# Patient Record
Sex: FEMALE | Race: AMERICAN INDIAN OR ALASKA NATIVE | NOT HISPANIC OR LATINO | Employment: PART TIME | URBAN - METROPOLITAN AREA
[De-identification: names, ages, dates, MRNs, and addresses within clinical notes are randomized per-mention and may not be internally consistent; named-entity substitution may affect disease eponyms.]

---

## 2022-08-19 ENCOUNTER — NURSE TRIAGE (OUTPATIENT)
Dept: NURSING | Facility: CLINIC | Age: 42
End: 2022-08-19

## 2022-08-19 NOTE — TELEPHONE ENCOUNTER
Nurse Triage SBAR    Is this a 2nd Level Triage? No    Situation: Patient is calling with concern of difficulty breathing, bad cough, chest hurts. Cough has been worsening over the past month. Cough, can't breathe during coughing spells. Has not taken a Covid test, does not have one available. Has a strong, continuous cough during the call.     Boyfriend, Darrick, is on the call, patient gave consent to speak with Darrick.     Background: Diagnosed with latent TB about one month TB.    Assessment:   Dark brown to green phlegm  Chest hurts   Does not feel warm right now.     Recommendation: Per disposition, see HCP within 4 hours (or PCP Triage). Patient is not currently established with [facility]. Recommended UC within 4 hours. Advised patient to call back with any new or worsening symptoms. Patient verbalized understanding and agrees with plan.    Protocol Recommended Disposition: Urgent care center    Karolina Cruz RN on 8/19/2022 at 11:31 AM    Reason for Disposition    [1] MILD difficulty breathing (e.g., minimal/no SOB at rest, SOB with walking, pulse <100) AND [2] still present when not coughing    Additional Information    Negative: SEVERE difficulty breathing (e.g., struggling for each breath, speaks in single words)    Negative: Bluish (or gray) lips or face now    Negative: [1] Difficulty breathing AND [2] exposure to flames, smoke, or fumes    Negative: Sounds like a life-threatening emergency to the triager    Negative: [1] Stridor AND [2] difficulty breathing    Negative: [1] Previous asthma attacks AND [2] this feels like asthma attack    Negative: Dry cough (non-productive;  no sputum or minimal clear sputum)    Negative: [1] MODERATE difficulty breathing (e.g., speaks in phrases, SOB even at rest, pulse 100-120) AND [2] still present when not coughing    Negative: Chest pain  (Exception: MILD central chest pain, present only when coughing)    Negative: Patient sounds very sick or weak to the  triager    Protocols used: COUGH - ACUTE [id number]

## 2022-08-20 ENCOUNTER — OFFICE VISIT (OUTPATIENT)
Dept: URGENT CARE | Facility: URGENT CARE | Age: 42
End: 2022-08-20
Payer: COMMERCIAL

## 2022-08-20 VITALS
TEMPERATURE: 98.9 F | WEIGHT: 179 LBS | BODY MASS INDEX: 30.73 KG/M2 | HEART RATE: 133 BPM | DIASTOLIC BLOOD PRESSURE: 124 MMHG | OXYGEN SATURATION: 97 % | SYSTOLIC BLOOD PRESSURE: 175 MMHG

## 2022-08-20 DIAGNOSIS — F15.10 METHAMPHETAMINE USE (H): ICD-10-CM

## 2022-08-20 DIAGNOSIS — R07.9 CHEST PAIN, UNSPECIFIED TYPE: Primary | ICD-10-CM

## 2022-08-20 DIAGNOSIS — R00.0 TACHYCARDIA: ICD-10-CM

## 2022-08-20 PROCEDURE — 99205 OFFICE O/P NEW HI 60 MIN: CPT | Performed by: PHYSICIAN ASSISTANT

## 2022-08-20 PROCEDURE — 93000 ELECTROCARDIOGRAM COMPLETE: CPT | Performed by: PHYSICIAN ASSISTANT

## 2022-08-20 NOTE — PROGRESS NOTES
SUBJECTIVE:  Nikkie Balbuena is a 42 year old female who presents to the office with the CC of chest pain, cough, shortness of breath.  States injected methamphetamines 2 hours ago.  Patient is sweating, hyperactive.      Past Medical History:   Diagnosis Date     Bipolar disorder (H)      Depressive disorder      Hydradenitis      Hypertension          Current Outpatient Medications:      oxyCODONE (ROXICODONE) 5 MG immediate release tablet, Take 1-2 tablets (5-10 mg) by mouth every 6 hours as needed for pain, Disp: 20 tablet, Rfl: 0    Social History     Tobacco Use     Smoking status: Current Every Day Smoker     Packs/day: 0.50     Years: 10.00     Pack years: 5.00     Smokeless tobacco: Never Used   Substance Use Topics     Alcohol use: No     Alcohol/week: 0.0 standard drinks       ROS:  Review of systems negative except as stated above.    EXAM:  BP (!) 175/124   Pulse (!) 133   Temp 98.9  F (37.2  C) (Tympanic)   Wt 81.2 kg (179 lb)   SpO2 97%   BMI 30.73 kg/m    GENERAL APPEARANCE: marked distress, unable to find position of comfort, not able to calm  SKIN: diaphoretic    EKG: tachycardic      Assessment  / IMPRESSION  (R07.9) Chest pain, unspecified type  (primary encounter diagnosis)  (F15.10) Methamphetamine use (H)  (R00.0) Tachycardia  Plan: EKG 12-lead complete w/read - Clinics    To ED by ambulance

## 2022-09-27 ENCOUNTER — HOSPITAL ENCOUNTER (EMERGENCY)
Facility: CLINIC | Age: 42
Discharge: HOME | End: 2022-09-27
Attending: EMERGENCY MEDICINE | Admitting: EMERGENCY MEDICINE
Payer: COMMERCIAL

## 2022-09-27 VITALS
HEART RATE: 93 BPM | OXYGEN SATURATION: 99 % | RESPIRATION RATE: 16 BRPM | DIASTOLIC BLOOD PRESSURE: 95 MMHG | TEMPERATURE: 98.2 F | SYSTOLIC BLOOD PRESSURE: 153 MMHG

## 2022-09-27 DIAGNOSIS — F11.90 OPIOID USE DISORDER: ICD-10-CM

## 2022-09-27 DIAGNOSIS — E11.9 TYPE 2 DIABETES MELLITUS WITHOUT COMPLICATION, WITHOUT LONG-TERM CURRENT USE OF INSULIN (H): ICD-10-CM

## 2022-09-27 DIAGNOSIS — G51.0 BELL'S PALSY: ICD-10-CM

## 2022-09-27 LAB — GLUCOSE BLDC GLUCOMTR-MCNC: 159 MG/DL (ref 70–99)

## 2022-09-27 PROCEDURE — 250N000013 HC RX MED GY IP 250 OP 250 PS 637: Performed by: EMERGENCY MEDICINE

## 2022-09-27 PROCEDURE — 93005 ELECTROCARDIOGRAM TRACING: CPT | Mod: RTG

## 2022-09-27 PROCEDURE — 99284 EMERGENCY DEPT VISIT MOD MDM: CPT | Performed by: EMERGENCY MEDICINE

## 2022-09-27 PROCEDURE — 250N000012 HC RX MED GY IP 250 OP 636 PS 637: Performed by: EMERGENCY MEDICINE

## 2022-09-27 RX ORDER — VALACYCLOVIR HYDROCHLORIDE 1 G/1
1000 TABLET, FILM COATED ORAL ONCE
Status: COMPLETED | OUTPATIENT
Start: 2022-09-27 | End: 2022-09-27

## 2022-09-27 RX ORDER — PREDNISONE 20 MG/1
60 TABLET ORAL ONCE
Status: COMPLETED | OUTPATIENT
Start: 2022-09-27 | End: 2022-09-27

## 2022-09-27 RX ORDER — VALACYCLOVIR HYDROCHLORIDE 1 G/1
1000 TABLET, FILM COATED ORAL 3 TIMES DAILY
Qty: 21 TABLET | Refills: 0 | Status: SHIPPED | OUTPATIENT
Start: 2022-09-27 | End: 2023-03-09

## 2022-09-27 RX ORDER — PREDNISONE 20 MG/1
60 TABLET ORAL DAILY
Qty: 18 TABLET | Refills: 0 | Status: SHIPPED | OUTPATIENT
Start: 2022-09-28 | End: 2022-10-04

## 2022-09-27 RX ADMIN — PREDNISONE 60 MG: 20 TABLET ORAL at 22:33

## 2022-09-27 RX ADMIN — VALACYCLOVIR HYDROCHLORIDE 1000 MG: 1 TABLET, FILM COATED ORAL at 22:33

## 2022-09-27 ASSESSMENT — ENCOUNTER SYMPTOMS
NAUSEA: 0
ABDOMINAL PAIN: 0
SHORTNESS OF BREATH: 0
COUGH: 0
WEAKNESS: 1
DIFFICULTY URINATING: 0
SEIZURES: 0
SPEECH DIFFICULTY: 1
FEVER: 0
BACK PAIN: 0
EYE REDNESS: 0
DIARRHEA: 0
CONFUSION: 0
HEADACHES: 0
VOMITING: 0

## 2022-09-28 LAB
ATRIAL RATE - MUSE: 99 BPM
DIASTOLIC BLOOD PRESSURE - MUSE: NORMAL MMHG
INTERPRETATION ECG - MUSE: NORMAL
P AXIS - MUSE: NORMAL DEGREES
PR INTERVAL - MUSE: 142 MS
QRS DURATION - MUSE: 86 MS
QT - MUSE: 340 MS
QTC - MUSE: 436 MS
R AXIS - MUSE: 19 DEGREES
SYSTOLIC BLOOD PRESSURE - MUSE: NORMAL MMHG
T AXIS - MUSE: -20 DEGREES
VENTRICULAR RATE- MUSE: 99 BPM

## 2022-09-28 NOTE — DISCHARGE INSTRUCTIONS
Please be sure to use eyedrops to moisturize your left eye as the eyelid does not close completely.

## 2022-09-28 NOTE — ED PROVIDER NOTES
ED Provider Note  [facility]      History     Chief Complaint   Patient presents with     Facial Droop     Left facial drooping, noticed yesterday but getting worse today     Extremity Weakness     Bilateral arm weakness     HPI  Nikkie Balbuena is a 42 year old female who presents to the emergency department with acute onset left-sided facial weakness, blurry vision, slurred speech since yesterday, was getting worse today.  Patient reports that approximately 2 weeks ago she did develop a cough but seem to have recovered.  She denies any fevers or chills.  Reports she has a history of diabetes, takes metformin and states she is compliant.  However, she does not check her blood sugar regularly and she has checked it in the past and had it be elevated above 300.  She is unsure what level her glucose is at right now.  In triage she did report some bilateral arm weakness but does not endorse this currently to me.  Of note, patient also reports that she has not history of hidradenitis, hepatitis C, also has uses IV drugs regularly.  She denies having any skin lesions that she is concerned about infection currently.  She reports that she has been thinking about quitting and is interested in pursuing opioid detox.    Past Medical History  Past Medical History:   Diagnosis Date     Bipolar disorder (H)      Depressive disorder      Hydradenitis      Hypertension    Diabetes  Hepatitis C  IV drug use        Past Surgical History:   Procedure Laterality Date      SECTION      x2     CHOLECYSTECTOMY       DENTAL SURGERY  15     ORTHOPEDIC SURGERY       SOFT TISSUE SURGERY      armpits     [START ON 2022] predniSONE (DELTASONE) 20 MG tablet  valACYclovir (VALTREX) 1000 mg tablet  oxyCODONE (ROXICODONE) 5 MG immediate release tablet      Allergies   Allergen Reactions     Codeine Sulfate Anaphylaxis and GI Disturbance     Family History  No family history on file.  Social History    Social History     Tobacco Use     Smoking status: Current Every Day Smoker     Packs/day: 0.50     Years: 10.00     Pack years: 5.00     Smokeless tobacco: Never Used   Substance Use Topics     Alcohol use: No     Alcohol/week: 0.0 standard drinks     Drug use: IV Drug use (opioids, methamphetamine)      Past medical history, past surgical history, medications, allergies, family history, and social history were reviewed with the patient. No additional pertinent items.       Review of Systems   Constitutional: Negative for fever.   HENT: Negative for congestion.    Eyes: Positive for visual disturbance. Negative for redness.   Respiratory: Negative for cough and shortness of breath.    Cardiovascular: Negative for chest pain.   Gastrointestinal: Negative for abdominal pain, diarrhea, nausea and vomiting.   Genitourinary: Negative for difficulty urinating.   Musculoskeletal: Negative for back pain.   Skin: Negative for rash.   Neurological: Positive for speech difficulty and weakness. Negative for seizures and headaches.   Psychiatric/Behavioral: Negative for confusion.     A complete review of systems was performed with pertinent positives and negatives noted in the HPI, and all other systems negative.    Physical Exam   BP: (!) 166/123  Pulse: 92  Temp: 98.2  F (36.8  C)  Resp: 16  SpO2: 100 %  Physical Exam  Constitutional:       General: She is awake. She is not in acute distress.     Appearance: Normal appearance. She is well-developed. She is not ill-appearing or toxic-appearing.   HENT:      Head: Atraumatic.      Mouth/Throat:      Pharynx: No oropharyngeal exudate.   Eyes:      General: No scleral icterus.     Extraocular Movements: Extraocular movements intact.      Conjunctiva/sclera: Conjunctivae normal.      Pupils: Pupils are equal, round, and reactive to light.      Comments: Weak left eyelid   Cardiovascular:      Rate and Rhythm: Normal rate and regular rhythm.   Pulmonary:      Effort: Pulmonary  effort is normal. No respiratory distress.   Abdominal:      General: Abdomen is flat.   Musculoskeletal:         General: No tenderness.   Skin:     General: Skin is warm.      Findings: No rash.   Neurological:      Mental Status: She is alert and oriented to person, place, and time.      Cranial Nerves: Facial asymmetry present.      Deep Tendon Reflexes:      Reflex Scores:       Patellar reflexes are 2+ on the right side and 2+ on the left side.     Comments: Isolated peripheral facial nerve palsy with clear involvement of the forehead, mild decrease in sensation throughout the left side of her face.  The remainder of her neurologic exam is unrevealing and reassuring particularly in the remaining cranial nerves, bilateral upper extremities and lower extremities.   Psychiatric:         Attention and Perception: Attention normal.         Mood and Affect: Mood normal.         Speech: Speech normal.         Behavior: Behavior normal. Behavior is cooperative.         ED Course      Procedures                     Results for orders placed or performed during the hospital encounter of 09/27/22   Glucose by meter     Status: Abnormal   Result Value Ref Range    GLUCOSE BY METER POCT 159 (H) 70 - 99 mg/dL   EKG 12 lead     Status: None (Preliminary result)   Result Value Ref Range    Systolic Blood Pressure  mmHg    Diastolic Blood Pressure  mmHg    Ventricular Rate 99 BPM    Atrial Rate 99 BPM    DC Interval 142 ms    QRS Duration 86 ms     ms    QTc 436 ms    P Axis  degrees    R AXIS 19 degrees    T Axis -20 degrees    Interpretation ECG       Sinus rhythm  Low voltage QRS  Cannot rule out Anterior infarct , age undetermined  Abnormal ECG       Medications   predniSONE (DELTASONE) tablet 60 mg (has no administration in time range)   valACYclovir (VALTREX) tablet 1,000 mg (has no administration in time range)        Assessments & Plan (with Medical Decision Making)   Nikkie Balbuena is a 42 year old female who  presents to the emergency department with acute onset left-sided facial weakness, blurry vision, slurred speech since yesterday, was getting worse today.  History and exam are most consistent with an isolated Bell's palsy.  She has very clear forehead involvement and the remainder of her neurologic exam is reassuring (the decrease in facial sensation is of unclear significance and the least reliable portion of the exam).  Her blood sugar today is 159.  Although this is higher than her goal, I do not feel that her hyperglycemia explains any of her neurologic deficits nor is it sufficiently high where I would consider adjusting her medications or admission.  As far as her Bell's palsy, will initiate treatment here with prednisone as well as valacyclovir, referred to neurology for outpatient follow-up.  Patient reports that she has a primary care physician but does not like that physician, we will go ahead and place a referral for primary care as she would benefit from close follow-up regarding her diabetes as well as hepatitis C.  We will also provide with information regarding the recovery clinic to assist with medication assisted treatment for her opioid use disorder.    I have reviewed the nursing notes. I have reviewed the findings, diagnosis, plan and need for follow up with the patient.    At this point no indication for further emergent labs or imaging.  We will proceed with discharge with a prescription for prednisone as well as valacyclovir and the outpatient referrals as above.  Return precautions given.  Patient voiced understanding and agreement with the plan.  Okay to discharge.    New Prescriptions    PREDNISONE (DELTASONE) 20 MG TABLET    Take 3 tablets (60 mg) by mouth daily for 6 days    VALACYCLOVIR (VALTREX) 1000 MG TABLET    Take 1 tablet (1,000 mg) by mouth 3 times daily       Final diagnoses:   Bell's palsy   Opioid use disorder       --  Jabier Pearce MD PhD  [facility] EMERGENCY  DEPARTMENT  9/27/2022     Jabier Pearce MD  09/27/22 [id number]

## 2022-10-04 ENCOUNTER — APPOINTMENT (OUTPATIENT)
Dept: GENERAL RADIOLOGY | Facility: CLINIC | Age: 42
End: 2022-10-04
Attending: EMERGENCY MEDICINE
Payer: COMMERCIAL

## 2022-10-04 ENCOUNTER — HOSPITAL ENCOUNTER (EMERGENCY)
Facility: CLINIC | Age: 42
Discharge: HOME | End: 2022-10-05
Attending: EMERGENCY MEDICINE | Admitting: EMERGENCY MEDICINE
Payer: COMMERCIAL

## 2022-10-04 DIAGNOSIS — F19.91 HISTORY OF DRUG USE: ICD-10-CM

## 2022-10-04 DIAGNOSIS — R41.82 ALTERED MENTAL STATUS, UNSPECIFIED ALTERED MENTAL STATUS TYPE: ICD-10-CM

## 2022-10-04 LAB
ANION GAP SERPL CALCULATED.3IONS-SCNC: 10 MMOL/L (ref 7–15)
APAP SERPL-MCNC: <5 UG/ML (ref 10–30)
BASOPHILS # BLD AUTO: 0 10E3/UL (ref 0–0.2)
BASOPHILS NFR BLD AUTO: 1 %
BUN SERPL-MCNC: 10 MG/DL (ref 7–30)
BUN SERPL-MCNC: 10.2 MG/DL (ref 6–20)
CA-I BLD-MCNC: 5 MG/DL (ref 4.4–5.2)
CALCIUM SERPL-MCNC: 9.1 MG/DL (ref 8.6–10)
CHLORIDE BLD-SCNC: 107 MMOL/L (ref 94–109)
CHLORIDE SERPL-SCNC: 107 MMOL/L (ref 98–107)
CO2 BLD-SCNC: 26 MMOL/L (ref 20–32)
CREAT BLD-MCNC: 0.6 MG/DL (ref 0.5–1)
CREAT SERPL-MCNC: 0.66 MG/DL (ref 0.51–0.95)
DEPRECATED HCO3 PLAS-SCNC: 25 MMOL/L (ref 22–29)
EOSINOPHIL # BLD AUTO: 0.1 10E3/UL (ref 0–0.7)
EOSINOPHIL NFR BLD AUTO: 2 %
ERYTHROCYTE [DISTWIDTH] IN BLOOD BY AUTOMATED COUNT: 13.3 % (ref 10–15)
ETHANOL SERPL-MCNC: <0.01 G/DL
FLUAV RNA SPEC QL NAA+PROBE: NEGATIVE
FLUBV RNA RESP QL NAA+PROBE: NEGATIVE
GFR SERPL CREATININE-BSD FRML MDRD: >90 ML/MIN/1.73M2
GLUCOSE BLD-MCNC: 101 MG/DL (ref 70–99)
GLUCOSE SERPL-MCNC: 102 MG/DL (ref 70–99)
HCG SER QL IA.RAPID: NEGATIVE
HCT VFR BLD AUTO: 36.6 % (ref 35–47)
HCT VFR BLD CALC: 40 % (ref 35–47)
HGB BLD-MCNC: 11.8 G/DL (ref 11.7–15.7)
HGB BLD-MCNC: 13.6 G/DL (ref 11.7–15.7)
HOLD SPECIMEN: NORMAL
IMM GRANULOCYTES # BLD: 0 10E3/UL
IMM GRANULOCYTES NFR BLD: 0 %
LACTATE SERPL-SCNC: 0.5 MMOL/L (ref 0.7–2)
LYMPHOCYTES # BLD AUTO: 2.1 10E3/UL (ref 0.8–5.3)
LYMPHOCYTES NFR BLD AUTO: 35 %
MCH RBC QN AUTO: 27.8 PG (ref 26.5–33)
MCHC RBC AUTO-ENTMCNC: 32.2 G/DL (ref 31.5–36.5)
MCV RBC AUTO: 86 FL (ref 78–100)
MONOCYTES # BLD AUTO: 0.4 10E3/UL (ref 0–1.3)
MONOCYTES NFR BLD AUTO: 7 %
NEUTROPHILS # BLD AUTO: 3.4 10E3/UL (ref 1.6–8.3)
NEUTROPHILS NFR BLD AUTO: 55 %
NRBC # BLD AUTO: 0 10E3/UL
NRBC BLD AUTO-RTO: 0 /100
PLATELET # BLD AUTO: 411 10E3/UL (ref 150–450)
POTASSIUM BLD-SCNC: 4.2 MMOL/L (ref 3.4–5.3)
POTASSIUM SERPL-SCNC: 3.8 MMOL/L (ref 3.4–5.3)
RBC # BLD AUTO: 4.24 10E6/UL (ref 3.8–5.2)
RSV RNA SPEC NAA+PROBE: NEGATIVE
SALICYLATES SERPL-MCNC: <0.5 MG/DL
SARS-COV-2 RNA RESP QL NAA+PROBE: NEGATIVE
SODIUM BLD-SCNC: 145 MMOL/L (ref 133–144)
SODIUM SERPL-SCNC: 142 MMOL/L (ref 136–145)
WBC # BLD AUTO: 6.1 10E3/UL (ref 4–11)

## 2022-10-04 PROCEDURE — 93005 ELECTROCARDIOGRAM TRACING: CPT

## 2022-10-04 PROCEDURE — 36415 COLL VENOUS BLD VENIPUNCTURE: CPT | Performed by: EMERGENCY MEDICINE

## 2022-10-04 PROCEDURE — 84702 CHORIONIC GONADOTROPIN TEST: CPT

## 2022-10-04 PROCEDURE — 80143 DRUG ASSAY ACETAMINOPHEN: CPT | Performed by: EMERGENCY MEDICINE

## 2022-10-04 PROCEDURE — 82077 ASSAY SPEC XCP UR&BREATH IA: CPT | Performed by: EMERGENCY MEDICINE

## 2022-10-04 PROCEDURE — 82310 ASSAY OF CALCIUM: CPT | Performed by: EMERGENCY MEDICINE

## 2022-10-04 PROCEDURE — 71046 X-RAY EXAM CHEST 2 VIEWS: CPT

## 2022-10-04 PROCEDURE — 84484 ASSAY OF TROPONIN QUANT: CPT | Performed by: EMERGENCY MEDICINE

## 2022-10-04 PROCEDURE — C9803 HOPD COVID-19 SPEC COLLECT: HCPCS

## 2022-10-04 PROCEDURE — 250N000011 HC RX IP 250 OP 636

## 2022-10-04 PROCEDURE — 87637 SARSCOV2&INF A&B&RSV AMP PRB: CPT | Performed by: EMERGENCY MEDICINE

## 2022-10-04 PROCEDURE — 80179 DRUG ASSAY SALICYLATE: CPT | Performed by: EMERGENCY MEDICINE

## 2022-10-04 PROCEDURE — 85014 HEMATOCRIT: CPT | Performed by: EMERGENCY MEDICINE

## 2022-10-04 PROCEDURE — 96376 TX/PRO/DX INJ SAME DRUG ADON: CPT

## 2022-10-04 PROCEDURE — 83605 ASSAY OF LACTIC ACID: CPT | Performed by: EMERGENCY MEDICINE

## 2022-10-04 PROCEDURE — 250N000011 HC RX IP 250 OP 636: Performed by: EMERGENCY MEDICINE

## 2022-10-04 PROCEDURE — 99285 EMERGENCY DEPT VISIT HI MDM: CPT | Mod: 25

## 2022-10-04 PROCEDURE — 96374 THER/PROPH/DIAG INJ IV PUSH: CPT

## 2022-10-04 PROCEDURE — 80047 BASIC METABLC PNL IONIZED CA: CPT

## 2022-10-04 RX ORDER — LORAZEPAM 2 MG/ML
0.5 INJECTION INTRAMUSCULAR ONCE
Status: COMPLETED | OUTPATIENT
Start: 2022-10-04 | End: 2022-10-04

## 2022-10-04 RX ORDER — DIPHENHYDRAMINE HYDROCHLORIDE 50 MG/ML
25 INJECTION INTRAMUSCULAR; INTRAVENOUS ONCE
Status: DISCONTINUED | OUTPATIENT
Start: 2022-10-04 | End: 2022-10-04

## 2022-10-04 RX ORDER — LORAZEPAM 2 MG/ML
1 INJECTION INTRAMUSCULAR ONCE
Status: COMPLETED | OUTPATIENT
Start: 2022-10-04 | End: 2022-10-04

## 2022-10-04 RX ORDER — LORAZEPAM 2 MG/ML
INJECTION INTRAMUSCULAR
Status: COMPLETED
Start: 2022-10-04 | End: 2022-10-04

## 2022-10-04 RX ORDER — OLANZAPINE 5 MG/1
5 TABLET, ORALLY DISINTEGRATING ORAL AT BEDTIME
Status: DISCONTINUED | OUTPATIENT
Start: 2022-10-04 | End: 2022-10-04

## 2022-10-04 RX ADMIN — LORAZEPAM 1 MG: 2 INJECTION INTRAMUSCULAR; INTRAVENOUS at 19:39

## 2022-10-04 RX ADMIN — LORAZEPAM 1 MG: 2 INJECTION INTRAMUSCULAR at 19:39

## 2022-10-04 RX ADMIN — LORAZEPAM 0.5 MG: 2 INJECTION INTRAMUSCULAR; INTRAVENOUS at 15:38

## 2022-10-04 RX ADMIN — LORAZEPAM 0.5 MG: 2 INJECTION INTRAMUSCULAR; INTRAVENOUS at 20:11

## 2022-10-04 ASSESSMENT — ACTIVITIES OF DAILY LIVING (ADL)
ADLS_ACUITY_SCORE: 35

## 2022-10-04 ASSESSMENT — ENCOUNTER SYMPTOMS
TREMORS: 1
COUGH: 1
HEADACHES: 1

## 2022-10-04 NOTE — ED NOTES
Care Management Follow Up    Length of Stay (days): 0    Additional Information:  Patient presents for a drug assessment. Patient is under the influence and not appropriate to meet with at this time. SW placed substance use resources in the AVS.     MARGOT Matias, [facility]  Emergency Room   [phone]-Please contact the SW on the floor in which the patient is staying for any questions or concerns

## 2022-10-04 NOTE — ED NOTES
Bed: ED08  Expected date:   Expected time:   Means of arrival:   Comments:  12 to ED 8 plz for TAD

## 2022-10-04 NOTE — ED TRIAGE NOTES
Arrives via EMS after found in the bathroom being lethargic according to bystanders. Patient reports using fentanyl and meth today at 8 AM. Patient restless, but cooperative. ABCs intact, GCS 15, CMS intact.

## 2022-10-04 NOTE — DISCHARGE INSTRUCTIONS
Substance Use Resources    It is recommended that you abstain from all mood altering chemicals. Please contact the sober support hotline ([phone]) as needed; phones are answered 24 hours a day, 7 days a week.     To access substance abuse treatment you must have an assessment completed within 30 days of starting any program. Information for this to be completed and to secure funding if you have medical assistance or no insurance can be found through your County's chemical health intake line. If you have private insurance, call the customer service number on the back of your insurance card to find an in-network substance abuse assessment. The ideal provider will be a treatment facility, licensed in the state [facility].    For those with [place] Medicaid you will need a Rule 25 assessment: The following are phone numbers for each county. Rule 25 assessments must be completed by your current county of residence. Once approved for funding you can connect with a facility that does Rule 25 assessment.  [facility] [phone]  [facility] [phone]  [facility] [phone]  [facility] [phone]  [facility] [phone]  [facility] [phone]  [facility] [phone]  [place] - [phone]    The following facilities also offer Rule 25/chemical health assessments:    [facility]  [phone]  Mon-Friday: [address] [facility], [address]  Saturday:  [address], [place], [address]  M-F assessments (7a-1:45p); Saturday assessments (7:45-10:45a)    [facility]  [phone]  [address] [place], [place], [address]  *by appointment only M-W; walk ins available Fridays from 10-2.    [facility]  [phone] (phone consultation available 24/7)  In-person Assessments: [address], [address], [place] [address]  [address] [address] [place] [address]  [address] [place] [address]  [address] [place] [address]    [facility]   [phone]  [address] [facility],  [place], [place], [address]  *walk in and appointments available by calling    [facility]  [phone]  [address] [place], [place], [address]  *by appointment only M-Th     [facility]  [phone]  [address] [place], [place], [address]  *walk ins available M-F    [facility]  [phone]  [address] [place], [place], [address]  *available by appointments only    [facility]  [phone]  [address] [place], [place], [address]  *available by appointment only    [facility] ([facility])  [phone]  [address], [address]  *Walk in assessments also available M-F starting at 8 am.    [facility]  [phone]  [address] [place], [place], [address]  *available by appointments    [facility]  [phone]  [address] [place], [place], [address]  *walk in assessments available M-F starting at 7 am.    [facility]  [phone]  [facility]  [address], [place], [place], [address]  *Walk in assessments availble M-F starting at 8 am OR [phone]    [facility]  [address] , [place] [address]  *Walk in assessments available M-F starting at 8 am    [facility]  [phone]  [address] [place] [address]    [facility]  [phone]  [address] [place], [place], [address]  *available by appointment only    [facility]  [phone]  [address] [facility] [place], [place], [address]    [facility]  [phone]  [address], [place], [address]  *available by appointment    [facility]  [phone]  [address], [place], [place], [address]  *walk ins available M-TH from 9-3    [facility]  [phone]  [address] [place], [place], [address]    [place]   [phone]  [address] [place] [address]  [address] [address], [place] [address]    [facility] (Does Rule 25 Assessments)  [email or web address]/  [phone]  [address], [address], [place] [address]    [facility]  [email or web address]-services/drug-alcohol-treatment  [phone], [address], [place] [address]  [phone], [address], [place], [place] [address] [phone], [address] [facility], [address], [place], [place] [address] [phone], [address], ([facility]), [place], [place] [address] [phone], [address] [address] [place] [address]    If you are intoxicated, you may be required to detox at a detox facility before starting treatment. The following are detox facilities that you can self present to. All detox facilities are able to help you complete an assessment/rule 25 prior to discharge if you choose:    [facility]: [address] [place], [place], [address]  [phone]  [facility]: [address] [place], [place], [id number] [phone]  [facility]: [address] [place], [place], [address] [phone]  [facility]: [address] [place], [place], [address] [phone]  [facility]: [address] [facility], [place], [address] [phone]     Ways to help cope with sobriety:  Take prescribed medicines as scheduled  Keep follow-up appointments  Talk to others about your concerns  Get regular exercise  Practice deep breathing skills  Eat a healthy diet  Use community resources, including hotline numbers, Critical access hospital crisis and support meetings  Stay sober and avoid places/people/things associated with substance use  Maintain a daily schedule/routine  Get at least 7-8 hours of sleep per night  Create a list 10--20 healthy activities that you can do that are enjoyable and do  not involve substance use  Create daily goals (approx. 1-4 goals) per day and work to achieve them throughout the day    [facility] ([facility]): [facility] connects people seeking recovery to resources that help foster and sustain long-term recovery. Whether you are seeking resources for treatment, transportation, housing, job training, education, health care or other pathways to recovery, [facility] is a great place to start. Phone: [phone]. www.[facility] (Great listing of all types of recovery and non-recovery related resources)    [facility]: https://www.[employer].org/    Alcoholics Anonymous: 1-800-ALCOHOL  HTTP://WWW.AA.ORG/  [facility] [place] (300-192-0857 or http://aaHD Fantasy Football.org)  [facility] [place] (048-611-2364 or www.aastpaul.org)    Narcotics Anonymous: 431.506.5792  www.Ceann[employer].us.    [facility]: [address] [place] [address], [address], [place]  [phone]  Drop-in Hours: Monday-Friday 9-11:30 am. By appointment at other times.  [facility] is a drop-in center on the [facility] side [facility] that provides a safe space for individuals who are homeless and have a history of chemical use. Sobriety is not a requirement but drugs and alcohol are not allowed on the property.  Non-clients can access drop-in services such as Recovery and Harm Reduction Groups, referrals to case management, community activities, shower facilities, and a pool table. Individuals who are homeless and have chemical health needs may be eligible for enrollment into [facility]'s case management program. Clients and  work together to access benefits, treatment, health care, shelter, and external housing resources.     [facility] Chemical Assessment & Referral Unit: [address] [place] [address]  [phone]  Monday-Friday 8 am-5 pm  Rule 25 assessment and referral for individuals seeking treatment or counseling for chemical dependency. Must be a resident of  [facility]. There is no fee for assessment. There is some funding available for treatment programs.    [facility]: [address] [facility]  [phone] or [phone]  Monday - Friday 7 am - 5 pm  Daily drop-in Rule 25 assessments and weekly mental health assessments and services. Outpatient chemical dependency treatment (with sober recovery housing), relapse prevention, case management, and employment services. Outpatient and residential treatment for women with children. Specializes in assisting individuals with a history of relapse who face multiple barriers to achieving stable recovery. No charge for most services or services can be billed through insurance. Gender-specific services and treatment for co-occurring substance abuse and mental health concerns offered.

## 2022-10-04 NOTE — ED PROVIDER NOTES
History   Chief Complaint:  Drug / Alcohol Assessment       HPI   History is somewhat limited from the patient given intoxication    Nikkie Balbuena is a 42 year old female with history of polysubstance abuse who presents with drug/alcohol assesment.  History is initially somewhat limited from the patient.  Per report, she was reportedly found in the [place] of a casino, lethargic per bystanders.  She reports she was having a cough.  She acknowledges using both methamphetamine as well as fentanyl today.  She reports administration through injection to her bilateral antecubital fossa.  EMS was contacted.  And route, the patient was restless though cooperative.  Here in the ED, she acknowledges a cough, though denies any other concerns.  She denies any other illicit drug use.  She denies alcohol use.  She denies any suicidal or homicidal ideation.  Of note, patient was recently seen at an outside ED with a diagnosis of Bell's palsy on 2022.  She was started on antiviral and steroid therapy.    Review of Systems   Respiratory: Positive for cough.    Neurological: Positive for tremors and headaches.   Psychiatric/Behavioral: Negative for suicidal ideas.   All other systems reviewed and are negative.      Allergies:  Codeine Sulfate    Medications:  Deltasone   Valtrex     Past Medical History:     Polysubstance abuse   [place] Palsy   Diabetes  Hepatitis C  Hypertension   Bipolar disorder   Depression    Past Surgical History:       Cholecystectomy   Dental surgery   Orthopedic surgery   Soft tissue surgery     Social History:  The patient presents to the ED alone via EMS.   Drug Use: fentanyl and methamphetamine     Physical Exam     Patient Vitals for the past 24 hrs:   BP Temp Pulse Resp SpO2   10/04/22 2300 -- -- 85 -- 98 %   10/04/22 2245 -- -- 96 -- 100 %   10/04/22 2230 -- -- 88 -- 99 %   10/04/22 2215 -- -- 90 -- 100 %   10/04/22 2200 -- -- 88 -- 98 %   10/04/22 2130 -- -- 90 -- 99 %   10/04/22 2115  -- -- 110 -- 100 %   10/04/22 2100 131/82 -- 101 -- 99 %   10/04/22 2045 -- -- 98 -- 98 %   10/04/22 2040 -- -- 93 -- 97 %   10/04/22 2035 -- -- 100 -- 99 %   10/04/22 2030 (!) 131/107 -- 104 -- 99 %   10/04/22 2010 -- -- 101 -- 100 %   10/04/22 2005 -- -- 103 -- --   10/04/22 2000 (!) 151/100 -- 93 -- 100 %   10/04/22 1950 -- -- 98 -- 100 %   10/04/22 1945 (!) 165/100 -- 101 -- 100 %   10/04/22 1930 -- -- -- (!) 40 (!) 86 %   10/04/22 1540 -- -- -- -- 98 %   10/04/22 1530 121/82 -- 101 -- 97 %   10/04/22 1458 131/85 98  F (36.7  C) 106 20 99 %       Physical Exam  General:              Well-nourished              Slurred speech              Limited historian  Head:              No gross deformity  Eyes:              Conjunctiva without injection or scleral icterus              Pupils 3 mm bilaterally  ENT:              Moist mucous membranes              Nares patent              Pinnae normal  Neck:              Full ROM              No stiffness appreciated  Resp:              Lungs CTAB              No crackles, wheezing or audible rubs              Good air movement  CV:                    Tachycardic rate, regular rhythm              S1 and S2 present              No murmur, gallop or rub  GI:              BS present              Abdomen soft without distention              Non-tender to light and deep palpation              No guarding or rebound tenderness  Skin:              Warm, dry, well perfused              No rashes or open wounds on exposed skin              Track marks noted to AC fossa bilaterally              No surrounding erythema              No expressible purulence  MSK:              Moves all extremities              No focal deformities or swelling   Able to ambulate independently in the ED  Neuro:              Moves all extremities             Psychomotor agitation  Psych:              Intoxicated              Denies SI or HI    Emergency Department Course   ECG  ECG taken at 1941, ECG  read at 1947  Normal sinus rhythm    Rate 99 bpm. HI interval 126 ms. QRS duration 76 ms. QT/QTc 340/436 ms. P-R-T axes 12 28 46.     Imaging:  Chest XR,  PA & LAT   Final Result   IMPRESSION: PA and lateral views of the chest were obtained.   Cardiomediastinal silhouette is within normal limits. No suspicious   focal pulmonary opacities. No significant pleural effusion or   pneumothorax. Right upper quadrant post cholecystectomy clips.      CORWIN FRAGOSO MD            SYSTEM ID:  [id number]        Report per radiology    Laboratory:  Labs Ordered and Resulted from Time of ED Arrival to Time of ED Departure   BASIC METABOLIC PANEL - Abnormal       Result Value    Sodium 142      Potassium 3.8      Chloride 107      Carbon Dioxide (CO2) 25      Anion Gap 10      Urea Nitrogen 10.2      Creatinine 0.66      Calcium 9.1      Glucose 102 (*)     GFR Estimate >90     ETHYL ALCOHOL LEVEL - Abnormal    Alcohol ethyl <0.01 (*)    ACETAMINOPHEN LEVEL - Abnormal    Acetaminophen <5.0 (*)    ISTAT BASIC CHEM ICA HEMATOCRIT POCT - Abnormal    TOTAL CO2 POCT 26      Creatinine POCT 0.6      Hematocrit POCT 40      Calcium, Ionized Whole Blood POCT 5.0      UREA NITROGEN POCT 10      Glucose Whole Blood POCT 101 (*)     Potassium POCT 4.2      Sodium POCT 145 (*)     Hemoglobin POCT 13.6      Chloride POCT 107     ISTAT HCG QUALITATIVE PREGNANCY POCT - Normal    HCG Qualitative POCT Negative     INFLUENZA A/B & SARS-COV2 PCR MULTIPLEX - Normal    Influenza A PCR Negative      Influenza B PCR Negative      RSV PCR Negative      SARS CoV2 PCR Negative     SALICYLATE LEVEL - Normal    Salicylate <0.5     CBC WITH PLATELETS AND DIFFERENTIAL    WBC Count 6.1      RBC Count 4.24      Hemoglobin 11.8      Hematocrit 36.6      MCV 86      MCH 27.8      MCHC 32.2      RDW 13.3      Platelet Count 411      % Neutrophils 55      % Lymphocytes 35      % Monocytes 7      % Eosinophils 2      % Basophils 1      % Immature Granulocytes 0       NRBCs per 100 WBC 0      Absolute Neutrophils 3.4      Absolute Lymphocytes 2.1      Absolute Monocytes 0.4      Absolute Eosinophils 0.1      Absolute Basophils 0.0      Absolute Immature Granulocytes 0.0      Absolute NRBCs 0.0     LACTIC ACID WHOLE BLOOD   TROPONIN T, HIGH SENSITIVITY        Procedures      Emergency Department Course:       Reviewed:  I reviewed nursing notes, vitals and past medical history    Assessments:  1513 I obtained history and examined the patient as noted above.     1644 I rechecked the patient and explained findings.     1931 Nurse informed me patient became more agitated and diaphoretic. I returned to check on patient.  We will continue with benzodiazepines.    1951 I returned to check on patient. Patient starting to look improved.     2140 I rechecked the patient.  She is sleeping and resting comfortably on gurney    Interventions:  Medications   LORazepam (ATIVAN) injection 0.5 mg (0.5 mg Intravenous Given 10/4/22 1538)   LORazepam (ATIVAN) injection 1 mg (1 mg Intravenous Given 10/4/22 1939)   LORazepam (ATIVAN) injection 0.5 mg (0.5 mg Intravenous Given 10/4/22 2011)       Disposition:  Care of the patient was transferred to my colleague Dr. Khalil pending disposition.     Impression & Plan     CMS Diagnoses: None    Medical Decision Making:  Nikkie Balbuena is a 42-year-old female presenting to the ED via EMS for evaluation of a drug and alcohol assessment.  VS on presentation reveal temperature of 98, HR of 106, and otherwise are unremarkable. Differential diagnosis includes infectious etiologies, electrolyte/metabolic derangements, trauma, hypo/hyperthermia, hypoxia, ischemia, uremia, acidosis, substance abuse, toxicologic etiologies, CVA/TIA, intra-cranial hemorrhage, intracranial mass, medication side effect / polypharmacy, as well as primary psychiatric causes.  Based on above history, high suspicion for toxicologic etiologies for patient's altered mental status and apparent  intoxication.  She acknowledges use of both fentanyl as well as methamphetamine.  Clinically, she show signs of psychomotor agitation, and is without evidence of respiratory depression.  She was initially provided 0.5 mg of IV Ativan.  Additional laboratory studies obtained, noting unremarkable metabolic function, CBC, negative hCG, negative Tylenol, and negative salicylate levels.  EKG demonstrate sinus rhythm with normal QRS and QTc durations.  Symptomatically, she acknowledges a cough, though chest x-ray negative for lobar infiltrate or other acute process, and COVID testing has returned negative.  As patient's ED course progressed, she did develop increasing agitation, and found to be diaphoretic.  She was able to answer simple questions, and was not demonstrating seizure activity.  Additional benzodiazepines were administered, with subsequent improvements in patient's agitation.  High suspicion for underlying stimulant induced altered mental status.  I otherwise see no objective signs of trauma on exam, patient has a nonfocal neurologic exam.  I do acknowledge prior history of Bell's palsy, though given the above history, have low suspicion for underlying infectious encephalopathy secondary to viral illness or bacterial illness requiring CT/LP at this time.  Patient presented to my partner of the overnight shift pending anticipated metabolization of illicit substances, and reassessment.  Should mental status remain altered despite a period of observation, further medical work-up may be necessary.  At this time, she is maintained on a health officer hold.  She denies any thoughts of self-harm or suicide, though psychiatric symptoms will also need to be reassessed on sobriety.    Diagnosis:    ICD-10-CM    1. Altered mental status, unspecified altered mental status type  R41.82    2. History of drug use  F19.91          Scribe Disclosure:  I, Jane Foote, am serving as a scribe at 3:13 PM on 10/4/2022 to  document services personally performed by Mathew Coe MD based on my observations and the provider's statements to me.            Mathew Coe MD  10/04/22 1986

## 2022-10-05 VITALS
SYSTOLIC BLOOD PRESSURE: 130 MMHG | OXYGEN SATURATION: 100 % | RESPIRATION RATE: 40 BRPM | TEMPERATURE: 98 F | HEART RATE: 97 BPM | DIASTOLIC BLOOD PRESSURE: 98 MMHG

## 2022-10-05 LAB
ATRIAL RATE - MUSE: 99 BPM
DIASTOLIC BLOOD PRESSURE - MUSE: NORMAL MMHG
INTERPRETATION ECG - MUSE: NORMAL
P AXIS - MUSE: 12 DEGREES
PR INTERVAL - MUSE: 126 MS
QRS DURATION - MUSE: 76 MS
QT - MUSE: 340 MS
QTC - MUSE: 436 MS
R AXIS - MUSE: 38 DEGREES
SYSTOLIC BLOOD PRESSURE - MUSE: NORMAL MMHG
T AXIS - MUSE: 46 DEGREES
TROPONIN T SERPL HS-MCNC: 16 NG/L
VENTRICULAR RATE- MUSE: 99 BPM

## 2022-10-05 ASSESSMENT — ACTIVITIES OF DAILY LIVING (ADL)
ADLS_ACUITY_SCORE: 35

## 2022-10-05 NOTE — ED PROVIDER NOTES
Patient signed out to me by Dr. Coe.  Please see his note for further details.  Plan at time of signout was reevaluation when clinically sober.  She is now clinically sober and up and ambulating and eating and drinking.  She reports no complaints.  Neuro troponin is minimally elevated and she denies any chest pain to me.  Do not believe this needs repeat evaluation as she had no chest pain.  She again endorses drug use as a likely cause of her presentation.  She has no mental health concerns and specifically no suicidal or homicidal ideation.  She appears clinically sober and safe to make her own decisions at this time.  She attempted to find a ride with was unable to secure one.  We gave her bus tokens and she feels comfortable this and appears safe to go home.  Return precautions were given and she was in no distress at time of discharge.     Jermaine Khalil MD  10/05/22 0648

## 2022-10-05 NOTE — ED NOTES
Pt awake. Pt denies any suicidal ideation or homicidal ideations. Pt reports she has a place to live and can get a ride if discharged. Pt pleasant and cooperative. Pt offered a meal and provider updated.     Ling Mcgovern RN on 10/5/2022 at 4:52 AM

## 2022-10-06 ENCOUNTER — NURSE TRIAGE (OUTPATIENT)
Dept: NURSING | Facility: CLINIC | Age: 42
End: 2022-10-06

## 2022-10-06 NOTE — TELEPHONE ENCOUNTER
What do I have to do to get some soboxone? I gave her the phone number to the [facility] who has addiction services.     Reason for Disposition    Caller has medicine question only, adult not sick, and triager answers question    Additional Information    Negative: Intentional drug overdose and suicidal thoughts or ideas    Negative: Drug overdose and triager unable to answer question    Negative: Caller requesting a renewal or refill of a medicine patient is currently taking    Negative: Caller requesting information unrelated to medicine    Negative: Caller requesting information about COVID-19 Vaccine    Negative: Caller requesting information about Emergency Contraception    Negative: Caller requesting information about Combined Birth Control Pills    Negative: Caller requesting information about Progestin Birth Control Pills    Negative: Caller requesting information about Post-Op pain or medicines    Negative: Caller requesting a prescription antibiotic (such as penicillin) for Strep throat and has a positive culture result    Negative: Caller requesting a prescription anti-viral med (such as Tamiflu) and has influenza (flu) symptoms    Negative: Immunization reaction suspected    Negative: Rash while taking a medicine or within 3 days of stopping it    Negative: Asthma and having symptoms of asthma (cough, wheezing, etc.)    Negative: Symptom of illness (e.g., headache, abdominal pain, earache, vomiting) that are more than mild    Negative: Breastfeeding questions about mother's medicines and diet    Negative: MORE THAN A DOUBLE DOSE of a prescription or over-the-counter (OTC) drug    Negative: DOUBLE DOSE (an extra dose or lesser amount) of prescription drug and any symptoms (e.g., dizziness, nausea, pain, sleepiness)    Negative: DOUBLE DOSE (an extra dose or lesser amount) of over-the-counter (OTC) drug and any symptoms (e.g., dizziness, nausea, pain, sleepiness)    Negative: Took another person's  prescription drug    Negative: DOUBLE DOSE (an extra dose or lesser amount) of prescription drug and NO symptoms  (Exception: A double dose of antibiotics.)    Negative: Diabetes drug error or overdose (e.g., took wrong type of insulin or took extra dose)    Negative: Caller has medication question about med NOT prescribed by PCP and triager unable to answer question (e.g., compatibility with other med, storage)    Negative: Prescription not at pharmacy and was prescribed by PCP recently  (Exception: triager has access to EMR and prescription is recorded there. Go to Home Care and confirm for pharmacy.)    Negative: Pharmacy calling with prescription question and triager unable to answer question    Negative: Caller has URGENT medicine question about med that PCP or specialist prescribed and triager unable to answer question    Negative: Caller has NON-URGENT medicine question about med that PCP or specialist prescribed and triager unable to answer question    Negative: Caller wants to use a complementary or alternative medicine    Negative: Medicine patch causing local rash or itching    Negative: Prescription request for new medicine (not a refill)    Negative: Prescription prescribed recently is not at pharmacy and triager has access to patient's EMR and prescription is recorded in the EMR    Negative: DOUBLE DOSE (an extra dose or lesser amount) of over-the-counter (OTC) drug and NO symptoms    Negative: DOUBLE DOSE (an extra dose or lesser amount) of antibiotic drug and NO symptoms    Protocols used: MEDICATION QUESTION CALL-A-[place]

## 2023-03-09 ENCOUNTER — OFFICE VISIT (OUTPATIENT)
Dept: NEUROLOGY | Facility: CLINIC | Age: 43
End: 2023-03-09
Attending: EMERGENCY MEDICINE
Payer: COMMERCIAL

## 2023-03-09 VITALS — HEIGHT: 64 IN | WEIGHT: 185 LBS | BODY MASS INDEX: 31.58 KG/M2

## 2023-03-09 DIAGNOSIS — G51.0 BELL'S PALSY: Primary | ICD-10-CM

## 2023-03-09 DIAGNOSIS — R56.9 SEIZURE-LIKE ACTIVITY (H): ICD-10-CM

## 2023-03-09 PROBLEM — F31.9 BIPOLAR DISORDER (H): Status: ACTIVE | Noted: 2023-03-09

## 2023-03-09 PROBLEM — E11.9 DIABETES MELLITUS, TYPE 2 (H): Status: ACTIVE | Noted: 2023-03-09

## 2023-03-09 PROBLEM — F19.10 POLYSUBSTANCE ABUSE (H): Status: ACTIVE | Noted: 2023-03-09

## 2023-03-09 PROCEDURE — 99205 OFFICE O/P NEW HI 60 MIN: CPT | Performed by: PSYCHIATRY & NEUROLOGY

## 2023-03-09 RX ORDER — ERGOCALCIFEROL 1.25 MG/1
50000 CAPSULE, LIQUID FILLED ORAL WEEKLY
COMMUNITY
Start: 2022-10-25

## 2023-03-09 RX ORDER — DIVALPROEX SODIUM 250 MG/1
250 TABLET, DELAYED RELEASE ORAL 2 TIMES DAILY
COMMUNITY
Start: 2022-07-12

## 2023-03-09 NOTE — NURSING NOTE
Chief Complaint   Patient presents with     [place] Palsy     Left sided facial numbness and drooping      Loss of Consciousness     Patient reports episodes of LOC     Xiomara Mccormick CMA on 3/9/2023 at 12:46 PM

## 2023-03-09 NOTE — LETTER
3/9/2023         RE: Nikkie Balbuena  [address]  [place] [address]        Dear Colleague,    Thank you for referring your patient, Nikkie Balbuena, to the [facility] [place]. Please see a copy of my visit note below.    NEUROLOGY CONSULTATION NOTE       [facility] [place]  [address] [place] [address]  Tel: [phone]  Fax: [phone]  [email or web address]     Nikkie Balbuena,  1980, MRN [id number]  PCP: No Ref-Primary, Physician  Date: 2023     ASSESSMENT & PLAN     Visit Diagnosis  1. Bell's palsy     Bell's palsy  42-year-old female with DM, IV drug use, hepatitis C, depression, bipolar disorder who was seen in the emergency room recently with left-sided Bell's palsy.  She was treated appropriately with prednisone and Valtrex and her symptoms have resolved.  I have told her this likely is due to her underlying diabetes but recommended:    1.  Check Lyme titer and angiotensin-converting enzyme  2.  She has made complete recovery and no further intervention is needed    Seizure-like activity  Patient is reporting over 2 years history of intermittent seizure-like activity during which she passes out and claims people have seen her shaking all over.  Although not convincing, her description raise the possibility of seizures.  I have recommended:    1.  MRI brain with and without contrast  2.  Sleep deprived EEG  3.  Follow-up after above tests    Thank you again for this referral, please feel free to contact me if you have any questions.    Javier Saldana MD  [facility]  (Formerly, [facility]     REASON FOR CONSULTATION [place] Palsy and Loss of Consciousness        HISTORY OF PRESENT ILLNESS     We have been requested by Dr. Pearce to evaluate Nikkie Balbuena who is a 42 year old  female for Bell's palsy    Patient is a 42-year-old female with history of diabetes, IV drug  use, hepatitis C, depression, bipolar disorder who was referred for evaluation of left-sided facial weakness, blurred vision and speech difficulty.  She was diagnosed with Bell's palsy and was started on Valtrex and prednisone and was referred for further evaluation.  No imaging studies were done.  Since that visit she was seen in the emergency room multiple times with altered mental status felt to be due to drug use.  However patient reports that for the last 2 years she has been having episodes of confusion and generalized shaking that she describes as seizures.  At times she has fallen down but denies tongue biting or bowel or bladder incontinence.  She is evasive if these episodes are associated with drug use     PROBLEM LIST   Patient Active Problem List   Diagnosis Code     Polysubstance abuse (H) F19.10     Diabetes mellitus, type 2 (H) E11.9     Bipolar disorder (H) F31.9         PAST MEDICAL & SURGICAL HISTORY     Past Medical History:   Patient  has a past medical history of Bipolar disorder (H), Depressive disorder, Hydradenitis, and Hypertension.    Surgical History:  She  has a past surgical history that includes orthopedic surgery; Cholecystectomy;  section; Soft tissue surgery; and Dental surgery (13).     SOCIAL HISTORY     Reviewed, and she  reports that she has been smoking cigarettes. She has a 5.00 pack-year smoking history. She has never used smokeless tobacco. She reports that she does not drink alcohol and does not use drugs.     FAMILY HISTORY     Reviewed, and family history includes Cerebrovascular Disease in her mother; Diabetes in her mother; Hyperlipidemia in her mother; Memory loss in her father.     ALLERGIES     Allergies   Allergen Reactions     Codeine Sulfate Anaphylaxis and GI Disturbance         REVIEW OF SYSTEMS     A 12 point review of system was performed and was negative except as outlined in the history of present illness.     HOME MEDICATIONS     Current  "Outpatient Rx   Medication Sig Dispense Refill     divalproex sodium delayed-release (DEPAKOTE) 250 MG DR tablet Take 250 mg by mouth 2 times daily       metFORMIN (GLUCOPHAGE) 500 MG tablet 500 mg 2 times daily (with meals)       vitamin D2 (ERGOCALCIFEROL) 15776 units (1250 mcg) capsule Take 50,000 Units by mouth once a week           PHYSICAL EXAM     Vital signs  Ht 1.626 m (5' 4\")   Wt 83.9 kg (185 lb)   BMI 31.76 kg/m      Weight:   185 lbs 0 oz    Middle-age female who is alert and oriented vital signs were reviewed and documented in electronic medical record.  Neck supple.  Neurologically speech was normal.  Cranial nerves II through XII are intact.  Motor strength 5/5.  Reflexes 1+ toes downgoing.  She has wide-based gait unable to tandem walk.  She has dysmetria on finger-nose testing and heel-knee-shin.  Sensation decreased below the knees bilaterally.     PERTINENT DIAGNOSTIC STUDIES     Following studies were reviewed:     MRI & MRA BRAIN 9/20/2015  MRI Head: No evidence of acute infarct, extra-axial collection, mass, mass   effect, midline shift, abnormal enhancement, or other acute intracranial   finding.     MRA Head: Major intracranial vessels are patent. No high grade stenoses or   evidence of aneurysm.     MRA Neck: Carotid and vertebral arteries patent w/o high grade stenosis.      PERTINENT LABS  Following labs were reviewed:  No visits with results within 3 Month(s) from this visit.   Latest known visit with results is:   Admission on 10/04/2022, Discharged on 10/05/2022   Component Date Value     Sodium 10/04/2022 142      Potassium 10/04/2022 3.8      Chloride 10/04/2022 107      Carbon Dioxide (CO2) 10/04/2022 25      Anion Gap 10/04/2022 10      Urea Nitrogen 10/04/2022 10.2      Creatinine 10/04/2022 0.66      Calcium 10/04/2022 9.1      Glucose 10/04/2022 102 (H)      GFR Estimate 10/04/2022 >90      Alcohol ethyl 10/04/2022 <0.01 (H)      WBC Count 10/04/2022 6.1      RBC Count " 10/04/2022 4.24      Hemoglobin 10/04/2022 11.8      Hematocrit 10/04/2022 36.6      MCV 10/04/2022 86      MCH 10/04/2022 27.8      MCHC 10/04/2022 32.2      RDW 10/04/2022 13.3      Platelet Count 10/04/2022 411      % Neutrophils 10/04/2022 55      % Lymphocytes 10/04/2022 35      % Monocytes 10/04/2022 7      % Eosinophils 10/04/2022 2      % Basophils 10/04/2022 1      % Immature Granulocytes 10/04/2022 0      NRBCs per 100 WBC 10/04/2022 0      Absolute Neutrophils 10/04/2022 3.4      Absolute Lymphocytes 10/04/2022 2.1      Absolute Monocytes 10/04/2022 0.4      Absolute Eosinophils 10/04/2022 0.1      Absolute Basophils 10/04/2022 0.0      Absolute Immature Granul* 10/04/2022 0.0      Absolute NRBCs 10/04/2022 0.0      HCG Qualitative POCT 10/04/2022 Negative      Influenza A PCR 10/04/2022 Negative      Influenza B PCR 10/04/2022 Negative      RSV PCR 10/04/2022 Negative      SARS CoV2 PCR 10/04/2022 Negative      Hold Specimen 10/04/2022 JIC      Hold Specimen 10/04/2022 JIC      Hold Specimen 10/04/2022 JIC      Hold Specimen 10/04/2022 JIC      Hold Specimen 10/04/2022 JIC      Salicylate 10/04/2022 <0.5      Acetaminophen 10/04/2022 <5.0 (L)      Lactic Acid 10/04/2022 0.5 (L)      TOTAL CO2 POCT 10/04/2022 26      Creatinine POCT 10/04/2022 0.6      Hematocrit POCT 10/04/2022 40      Calcium, Ionized Whole B* 10/04/2022 5.0      UREA NITROGEN POCT 10/04/2022 10      Glucose Whole Blood POCT 10/04/2022 101 (H)      Potassium POCT 10/04/2022 4.2      Sodium POCT 10/04/2022 145 (H)      Hemoglobin POCT 10/04/2022 13.6      Chloride POCT 10/04/2022 107      Troponin T, High Sensiti* 10/04/2022 16 (H)      Ventricular Rate 10/04/2022 99      Atrial Rate 10/04/2022 99      GA Interval 10/04/2022 126      QRS Duration 10/04/2022 76      QT 10/04/2022 340      QTc 10/04/2022 436      P Axis 10/04/2022 12      R AXIS 10/04/2022 38      T Axis 10/04/2022 46      Interpretation ECG 10/04/2022                       Value:Sinus rhythm  Normal ECG  When compared with ECG of 27-SEP-2022 21:27,  Minimal criteria for Anterior infarct are no longer Present  Nonspecific T wave abnormality no longer evident in Inferior leads  Nonspecific T wave abnormality no longer evident in Anterior leads  Confirmed by - EMERGENCY ROOM, PHYSICIAN ([place]),  VIVIAN ALTAMIRANO ([place]) on 10/5/2022 6:35:14 AM          Total time spent for face to face visit, reviewing labs/imaging studies, counseling and coordination of care was: 1 Hour spent on the date of the encounter doing chart review, review of outside records, review of test results, interpretation of tests, patient visit and documentation       This note was dictated using voice recognition software.  Any grammatical or context distortions are unintentional and inherent to the software.    Orders Placed This Encounter   Procedures     MR Brain w/o & w Contrast     Lyme Disease Total Abs Bld with Reflex to Confirm CLIA     Angiotensin converting enzyme     EEG Sleep Deprived      New Prescriptions    No medications on file      Modified Medications    No medications on file              Again, thank you for allowing me to participate in the care of your patient.        Sincerely,        Javier Saldana MD

## 2023-03-09 NOTE — PROGRESS NOTES
NEUROLOGY CONSULTATION NOTE       [facility] [place]  [address] [place] [address]  Tel: [phone]  Fax: [phone]  [email or web address]     Nikkie Balbuena,  1980, MRN [id number]  PCP: No Ref-Primary, Physician  Date: 2023     ASSESSMENT & PLAN     Visit Diagnosis  1. Bell's palsy     Bell's palsy  42-year-old female with DM, IV drug use, hepatitis C, depression, bipolar disorder who was seen in the emergency room recently with left-sided Bell's palsy.  She was treated appropriately with prednisone and Valtrex and her symptoms have resolved.  I have told her this likely is due to her underlying diabetes but recommended:    1.  Check Lyme titer and angiotensin-converting enzyme  2.  She has made complete recovery and no further intervention is needed    Seizure-like activity  Patient is reporting over 2 years history of intermittent seizure-like activity during which she passes out and claims people have seen her shaking all over.  Although not convincing, her description raise the possibility of seizures.  I have recommended:    1.  MRI brain with and without contrast  2.  Sleep deprived EEG  3.  Follow-up after above tests    Thank you again for this referral, please feel free to contact me if you have any questions.    Javier Saldana MD  [facility]  ([facility], [facility]     REASON FOR CONSULTATION [place] Palsy and Loss of Consciousness        HISTORY OF PRESENT ILLNESS     We have been requested by Dr. Pearce to evaluate Nikkie Balbuena who is a 42 year old  female for Bell's palsy    Patient is a 42-year-old female with history of diabetes, IV drug use, hepatitis C, depression, bipolar disorder who was referred for evaluation of left-sided facial weakness, blurred vision and speech difficulty.  She was diagnosed with Bell's palsy and was started on Valtrex and prednisone and was referred for further  evaluation.  No imaging studies were done.  Since that visit she was seen in the emergency room multiple times with altered mental status felt to be due to drug use.  However patient reports that for the last 2 years she has been having episodes of confusion and generalized shaking that she describes as seizures.  At times she has fallen down but denies tongue biting or bowel or bladder incontinence.  She is evasive if these episodes are associated with drug use     PROBLEM LIST   Patient Active Problem List   Diagnosis Code     Polysubstance abuse (H) F19.10     Diabetes mellitus, type 2 (H) E11.9     Bipolar disorder (H) F31.9         PAST MEDICAL & SURGICAL HISTORY     Past Medical History:   Patient  has a past medical history of Bipolar disorder (H), Depressive disorder, Hydradenitis, and Hypertension.    Surgical History:  She  has a past surgical history that includes orthopedic surgery; Cholecystectomy;  section; Soft tissue surgery; and Dental surgery (13).     SOCIAL HISTORY     Reviewed, and she  reports that she has been smoking cigarettes. She has a 5.00 pack-year smoking history. She has never used smokeless tobacco. She reports that she does not drink alcohol and does not use drugs.     FAMILY HISTORY     Reviewed, and family history includes Cerebrovascular Disease in her mother; Diabetes in her mother; Hyperlipidemia in her mother; Memory loss in her father.     ALLERGIES     Allergies   Allergen Reactions     Codeine Sulfate Anaphylaxis and GI Disturbance         REVIEW OF SYSTEMS     A 12 point review of system was performed and was negative except as outlined in the history of present illness.     HOME MEDICATIONS     Current Outpatient Rx   Medication Sig Dispense Refill     divalproex sodium delayed-release (DEPAKOTE) 250 MG DR tablet Take 250 mg by mouth 2 times daily       metFORMIN (GLUCOPHAGE) 500 MG tablet 500 mg 2 times daily (with meals)       vitamin D2 (ERGOCALCIFEROL)  "38888 units (1250 mcg) capsule Take 50,000 Units by mouth once a week           PHYSICAL EXAM     Vital signs  Ht 1.626 m (5' 4\")   Wt 83.9 kg (185 lb)   BMI 31.76 kg/m      Weight:   185 lbs 0 oz    Middle-age female who is alert and oriented vital signs were reviewed and documented in electronic medical record.  Neck supple.  Neurologically speech was normal.  Cranial nerves II through XII are intact.  Motor strength 5/5.  Reflexes 1+ toes downgoing.  She has wide-based gait unable to tandem walk.  She has dysmetria on finger-nose testing and heel-knee-shin.  Sensation decreased below the knees bilaterally.     PERTINENT DIAGNOSTIC STUDIES     Following studies were reviewed:     MRI & MRA BRAIN 9/20/2015  MRI Head: No evidence of acute infarct, extra-axial collection, mass, mass   effect, midline shift, abnormal enhancement, or other acute intracranial   finding.     MRA Head: Major intracranial vessels are patent. No high grade stenoses or   evidence of aneurysm.     MRA Neck: Carotid and vertebral arteries patent w/o high grade stenosis.      PERTINENT LABS  Following labs were reviewed:  No visits with results within 3 Month(s) from this visit.   Latest known visit with results is:   Admission on 10/04/2022, Discharged on 10/05/2022   Component Date Value     Sodium 10/04/2022 142      Potassium 10/04/2022 3.8      Chloride 10/04/2022 107      Carbon Dioxide (CO2) 10/04/2022 25      Anion Gap 10/04/2022 10      Urea Nitrogen 10/04/2022 10.2      Creatinine 10/04/2022 0.66      Calcium 10/04/2022 9.1      Glucose 10/04/2022 102 (H)      GFR Estimate 10/04/2022 >90      Alcohol ethyl 10/04/2022 <0.01 (H)      WBC Count 10/04/2022 6.1      RBC Count 10/04/2022 4.24      Hemoglobin 10/04/2022 11.8      Hematocrit 10/04/2022 36.6      MCV 10/04/2022 86      MCH 10/04/2022 27.8      MCHC 10/04/2022 32.2      RDW 10/04/2022 13.3      Platelet Count 10/04/2022 411      % Neutrophils 10/04/2022 55      % Lymphocytes " 10/04/2022 35      % Monocytes 10/04/2022 7      % Eosinophils 10/04/2022 2      % Basophils 10/04/2022 1      % Immature Granulocytes 10/04/2022 0      NRBCs per 100 WBC 10/04/2022 0      Absolute Neutrophils 10/04/2022 3.4      Absolute Lymphocytes 10/04/2022 2.1      Absolute Monocytes 10/04/2022 0.4      Absolute Eosinophils 10/04/2022 0.1      Absolute Basophils 10/04/2022 0.0      Absolute Immature Granul* 10/04/2022 0.0      Absolute NRBCs 10/04/2022 0.0      HCG Qualitative POCT 10/04/2022 Negative      Influenza A PCR 10/04/2022 Negative      Influenza B PCR 10/04/2022 Negative      RSV PCR 10/04/2022 Negative      SARS CoV2 PCR 10/04/2022 Negative      Hold Specimen 10/04/2022 JIC      Hold Specimen 10/04/2022 JIC      Hold Specimen 10/04/2022 JIC      Hold Specimen 10/04/2022 JIC      Hold Specimen 10/04/2022 JIC      Salicylate 10/04/2022 <0.5      Acetaminophen 10/04/2022 <5.0 (L)      Lactic Acid 10/04/2022 0.5 (L)      TOTAL CO2 POCT 10/04/2022 26      Creatinine POCT 10/04/2022 0.6      Hematocrit POCT 10/04/2022 40      Calcium, Ionized Whole B* 10/04/2022 5.0      UREA NITROGEN POCT 10/04/2022 10      Glucose Whole Blood POCT 10/04/2022 101 (H)      Potassium POCT 10/04/2022 4.2      Sodium POCT 10/04/2022 145 (H)      Hemoglobin POCT 10/04/2022 13.6      Chloride POCT 10/04/2022 107      Troponin T, High Sensiti* 10/04/2022 16 (H)      Ventricular Rate 10/04/2022 99      Atrial Rate 10/04/2022 99      [place] Interval 10/04/2022 126      QRS Duration 10/04/2022 76      QT 10/04/2022 340      QTc 10/04/2022 436      P Axis 10/04/2022 12      R AXIS 10/04/2022 38      T Axis 10/04/2022 46      Interpretation ECG 10/04/2022                      Value:Sinus rhythm  Normal ECG  When compared with ECG of 27-SEP-2022 21:27,  Minimal criteria for Anterior infarct are no longer Present  Nonspecific T wave abnormality no longer evident in Inferior leads  Nonspecific T wave abnormality no longer evident in  Anterior leads  Confirmed by - EMERGENCY ROOM, PHYSICIAN ([id number]),  VIVIAN ALTAMIRANO ([place]) on 10/5/2022 6:35:14 AM          Total time spent for face to face visit, reviewing labs/imaging studies, counseling and coordination of care was: 1 Hour spent on the date of the encounter doing chart review, review of outside records, review of test results, interpretation of tests, patient visit and documentation       This note was dictated using voice recognition software.  Any grammatical or context distortions are unintentional and inherent to the software.    Orders Placed This Encounter   Procedures     MR Brain w/o & w Contrast     Lyme Disease Total Abs Bld with Reflex to Confirm CLIA     Angiotensin converting enzyme     EEG Sleep Deprived      New Prescriptions    No medications on file      Modified Medications    No medications on file